# Patient Record
Sex: MALE | Race: WHITE
[De-identification: names, ages, dates, MRNs, and addresses within clinical notes are randomized per-mention and may not be internally consistent; named-entity substitution may affect disease eponyms.]

---

## 2017-02-04 NOTE — ERRECORD
[facility]

                                EMERGENCY RECORD



HPI EXTREMITY (20:22 WMEI)

CHIEF COMPLAINT: Patient presents for evaluation of

      numbness, Patient presents for evaluation of numbness

      tingling both upper extr. from shoulder to finger tips.

      HISTORIAN: History provided by patient, denies injury

      present for 2 wks worse at night. MECHANISM OF INJURY:

      Unknown mechanism. LOCATION: Symptoms are

      localized, both upper extr. TIME COURSE:

      Gradual onset of symptoms, 2, weeks ago.

      ASSOCIATED WITH: No associated distal injury, No associated

      fever, Associated with numbness, constant,

      Associated with tingling, constant.

      EXACERBATED BY: Patient's condition exacerbated by nothing.

      RELIEVED BY: Patient's condition relieved by

      nothing.



ROS (20:24 WMEI)

CONSTITUTIONAL: Historian denies chills, denies fever.

EYES: Historian denies eye pain, denies eye discharge.

ENT: Historian denies otalgia, denies rhinorrhea, denies sore

      throat.

CARDIOVASCULAR: Historian denies chest pain, no radiation.

RESPIRATORY: Historian denies cough, denies shortness of breath.

GI: Historian denies abdominal pain, denies nausea, denies

      vomiting.

GENITOURINARY MALE: Historian denies dysuria, denies urinary

      urgency.

MUSCULOSKELETAL: Historian denies injury, denies joint redness,

      reports joint stiffness, denies joint swelling.

SKIN: Historian denies skin changes, denies skin lesions.

NEUROLOGIC: Historian denies confusion, denies dizziness, denies

      mental status changes, denies paralysis, reports

      paresthesias.

PSYCHIATRIC: Historian denies emotional lability, denies mood

      changes.



PAST MEDICAL HISTORY

MEDICAL HISTORY: No past medical history. (18:14 [facility])

  Notes: hasn't been to doctor for 30 yrs. (20:26 WMEI)

MALE SURGICAL HISTORY: Patient has no surgical history. (18:14

      [facility])

PSYCHIATRIC HISTORY: No previous psychiatric history. (18:14

      [facility])

SOCIAL HISTORY: Patient drinks every day, more

      than 5 drinks per day, Patient denies drug use, Patient

      currently uses tobacco, smokes cigarettes, Patient smokes

      1.5 packs per day. (18:14 [facility])



KNOWN ALLERGIES

No Known Drug Allergies



&a-1R&a+25V*p+0X*i3282Y*c202B*c15G*c2P*p-0X&a-25V&a+1R          Name: Yo Barney  : 1960 M56 MedRec: [id number]

                             AcctNum: [id number]

  Prepared: Sun 2017 06:09 by Interface                 Page 1 of 3

                                      pMD

                        [facility]

                                EMERGENCY RECORD





CURRENT MEDICATIONS (18:15 [facility])

None



VITAL SIGNS

VITAL SIGNS: BP: 138/80, Pulse: 84, Resp: 20 (Non-Labored), Pain:

      8, O2 sat: 97 on Room Air, Time: 2017 18:13. (18:13 [facility])

  Temp: 97.9 (Oral), Time: 2017 18:21. (18:21 [facility])

  BP: 125/87, Pulse: 82, Resp: 18, O2 sat: 98 on RA, Time: 2017 19:16.

      (19:16 [facility])



PHYSICAL EXAM (20:25 [facility]MEI)

CONSTITUTIONAL: Vital signs reviewed, Patient appears non toxic,

      Patient alert and oriented to person, place and time.

HEAD: Head exam included findings of head atraumatic,

      normocephalic.

EYES: Conjunctiva normal, Sclera normal.

ENT: Ear exam normal, Nose exam normal, Pharynx exam normal.

NECK: Neck exam included findings of normal range of motion,

      Trachea midline.

RESPIRATORY CHEST: Breath sounds clear, Chest exam included

      findings of chest movement symmetrical.

CARDIOVASCULAR: Cardiovascular exam included findings of heart

      rate regular rate and rhythm, Heart sounds normal.

ABDOMEN MALE: Abdominal exam included findings of abdomen

      nontender, Liver normal, no distension.

UPPER EXTREMITY: Upper extremity exam included findings of

      inspection normal, range of motion normal, Radial pulse normal, Ulnar

      pulse normal, capillary refill less than 2 seconds, distal motor

      intact, distal sensory intact, no cyanosis, no clubbing, no edema.

LOWER EXTREMITY: Lower extremity exam included findings of

      inspection abnormal, Range of motion, Motor

      strength.

NEURO: [place] coma scale 15, Neuro exam findings include patient

      oriented to person, place and time, Speech normal, Gait normal.

SKIN: Skin exam included findings of skin warm, dry, and normal

      in color.

LYMPHATIC: Lymphatic exam normal.

PSYCHIATRIC: Psychiatric exam included findings of patient

      oriented to person place and time, Normal affect, Judgment normal,

      Insight normal.



MEDICATION ADMINISTRATION SUMMARY



      Drug Name: traMADol, Dose Ordered: 50 mg, Route: Oral, Status: Given,

      Time: 20:35 2017,

      Drug Name: acetaminophen oral, Dose Ordered: 1000 mg, Route: Oral,

      Status: Given, Time: 20:35 2017, Detailed record available in

      Medication Service section.





&a-1R&a+25V*p+0X*o6727M*c202B*c15G*c2P*p-0X&a-25V&a+1R          Name: Yo Barney  : 1960 M56 MedRec: [id number]

                             AcctNum: [id number]

  Prepared: Sun 2017 06:09 by Interface                 Page 2 of 3

                                      pMD

                        [facility]

                                EMERGENCY RECORD



PROBLEM LIST

  No recorded problems



DIAGNOSIS (20:33 [facility]MEI)

FINAL: PRIMARY: parestesias.



PRESCRIPTION (20:32 [facility]MEI)

traMADol:  TABLET : 50 mg : ORAL : Quantity: *** 1 *** Unit:

      tab(s) Route: ORAL Schedule: 2 times a day (before meals) Dispense:

      *** 30 *** Unit: tab(s)

      May substitute. Refills: *** No Refills ***.

NOTES:  No refills.



DISPOSITION

PATIENT:  Disposition Type: Discharge, Disposition: *Discharge

      Home. (20:33 [facility]MEI)

   Patient left the department. (20:44 [facility])

Simons:

  RENAE=REZA Jara, Edis TREVIÑO=REZA Crowley, Katie GRAFF=REZA Young, Dae

  WMEI=DO Hollingsworth William































































&a-1R&a+25V*p+0X*k6266W*c202B*c15G*c2P*p-0X&a-25V&a+1R          Name: Yo Barney  : 1960 M56 MedRec: [id number]

                             AcctNum: [id number]

  Prepared: Sun 2017 06:09 by Interface                 Page 3 of 3

                                      pMD

MTDD

## 2017-02-04 NOTE — RAD
CHEST 2 VIEWS:

 

Date:

02/04/17 

 

FINDINGS:

The heart is normal in size and the lungs are clear. There is no sign of pneumonia or pleural effusi
on. No vascular congestion seen. The trachea is midline. The bony structures are unremarkable. 

 

IMPRESSION: 

No acute findings.  

 

 

POS: HOME

## 2017-02-04 NOTE — PICIS
[facility]

                                EMERGENCY RECORD



TRIAGE (18:12 JHIG)

TRIAGE NOTES:  Pt states that for the past couple of weeks

      he has had intermittent numbness and tingling in his hands. States

      "it is like when your feet fall asleep" Pt states the only time it is

      better is when he stands with his arms down by his side. (18:12

      [facility])

PATIENT: NAME: Yo Barney, AGE: 56, GENDER: male, : , TIME OF GREET: Sat 2017 18:01, PREFERRED

      LANGUAGE: English, ETHNICITY: Not  or , ECODE BILLING

      MAP: [facility], SSN: [id number], Zip Code: [address], KG WEIGHT:

      81.65, PHONE: [phone], MEDICAL RECORD NUMBER: [id number],

      ACCOUNT NUMBER: [id number], PERSON ID: [id number], PAYMENT: SJX Self

      Pay, PCP: none. (18:12 [facility])

COMPLAINT:  hand numbness/tingling. (18:12 [facility])

ADMISSION: URGENCY: 3 Urgent, ADMISSION SOURCE: Home, TRANSPORT:

      [facility], BED: TRIAGE. (18:12 J[facility])

PAIN: Patient complains of pain described as,

      numb, tingling, on a scale 0-10 patient

      rates pain as 8, Pain is intermittent. (18:14 [facility])

IMMUNIZATIONS: Flu vaccine not up to date, Tetanus

      not up to date, Pneumococcal vaccine not up to date.

      (18:14 [facility])

SIRS SCORING: Heart Rate  (0), Temp range 96.8-101.1 (0),

      respiratory rate 12-24 (0), Mental Status altered: no (0), Total SIRS

      Score 0, Infection or Suspected Infection: No. (18:14 [facility])

TRIAGE SCREENING: Patient denies suicidal ideation, Patient

      denies presence of domestic violence. (18:14 [facility])

PROVIDERS: TRIAGE NURSE: Dae Young RN. (18:12 [facility])

VITAL SIGNS: /80, Pulse 84, Resp 20, (Non-Labored), Pain 8,

      O2 Sat 97, on Room Air, Time 2017 18:13. (18:13 [facility])



KNOWN ALLERGIES

No Known Drug Allergies



CURRENT MEDICATIONS (18:15 IG)

None



VITAL SIGNS

VITAL SIGNS: BP: 138/80, Pulse: 84, Resp: 20 (Non-Labored), Pain:

      8, O2 sat: 97 on Room Air, Time: 2017 18:13. (18:13 [facility])

  Temp: 97.9 (Oral), Time: 2017 18:21. (18:21 IG)

  BP: 125/87, Pulse: 82, Resp: 18, O2 sat: 98 on RA, Time: 2017 19:16.

      [facility]



NURSING ASSESSMENT: EXTREMITY UPPER (18:15 [facility])

CONSTITUTIONAL: Complex assessment performed, Patient arrives

      ambulatory, Gait steady, History obtained from patient, Patient

      appears, uncomfortable, Patient cooperative, Patient alert,

      Oriented to person, place and time, Skin warm, Skin dry, Skin normal

      in color, Mucous membranes pink, Mucous membranes moist, Patient is



&a-1R&a+25V*p+0X*c1030Y*c202B*c15G*c2P*p-0X&a-25V&a+1R          Name: Yo Barney  : 1960 M56 MedRec: [id number]

                             AcctNum: [id number]

  Prepared: Sun 2017 06:15 by Interface                 Page 1 of 6

                                      pMD

                        [facility]

                                EMERGENCY RECORD



      well-groomed, Patient complains of hand numbness and tingling,

      Pt states that for the past couple of weeks he has had

      intermittent numbness and tingling in his hands. States "it is like

      when your feet fall asleep" Pt states the only time it is better is

      when he stands with his arms down by his side.

PAIN: numb pain, tingling pain, to

      bilateral hands, on a scale 0-10 patient rates pain as

      8.

LEFT UPPER EXTREMITY: Left upper extremity assessment findings

      include capillary refill less than 2 seconds, Skin color normal to

      hand, Skin temperature to hand warm, Distal sensation intact, Muscle

      tone normal, muscle strength 5, non-pitting edema present.

RIGHT UPPER EXTREMITY: Right upper extremity assessment findings

      include capillary refill less than 2 seconds, Skin color normal to

      hand, Skin temperature to hand warm, Distal sensation intact, Muscle

      tone normal, muscle strength 5, non-pitting edema present.

SAFETY: Side rails up, Cart/Stretcher in lowest position, Family

      at bedside, Call light within reach, Hospital ID band on.



NURSING ASSESSMENT: NEURO (19:46 BMAD)

GCS: (6) Obeying command:, (5) Orientated:, (4) Spontaneous eye

      opening., Result: 15.

NIHSS: CVA assessment findings: Level of consciousness: alert,

      keenly responsive (0), Questions: answers both questions correctly

      (0), Commands: performs both tasks correctly (0), Best gaze: normal

      (0), Visual: no visual loss (0), Facial palsy: normal symmetrical

      movement (0), Motor Left Arm: no drift, arm stays 90/45 degrees for

      full 10 seconds (0), Motor Right Arm: no drift, arm stays 90/45

      degrees for full 10 seconds (0), Motor left leg: no drift, leg stays

      at 30 degrees for full five seconds (0), Motor right leg: no drift,

      leg stays at 30 degrees for full five seconds (0), Limb ataxia absent

      (0), Sensory: normal, no sensory loss (0), Best language: no aphasia;

      normal (0), Dysarthria: normal (0), Extinction and Inattention:

      normal (0), Total score 0.



NURSING PROCEDURE: DISCHARGE NOTE (20:43 BMAD)

DISCHARGE: Patient discharged to home, ambulating without

      assistance, family driving, accompanied by /wife/partner,

      Summary of Care printed/ provided, Patient requested and was provided

      an electronic copy of Discharge Instructions, Transition record given

      to patient, Discharge instructions given to patient, Simple or

      moderate discharge teaching performed, by Edis COBB, Prescriptions

      given and instructions on side effects given, Name of prescription(s)

      given: Tramadol, Above person(s) verbalized understanding of

      discharge instructions and follow-up care, Patient discharged by, Dr. Hollingsworth.

BELONGINGS: Belongings and valuables with patient at time of

      discharge include:, Belongings remain with patient, Valuables remain

      with patient.





&a-1R&a+25V*p+0X*p7793R*c202B*c15G*c2P*p-0X&a-25V&a+1R          Name: Yo Barney  : 1960 M56 MedRec: [id number]

                             AcctNum: [id number]

  Prepared: Sun 2017 06:15 by Interface                 Page 2 of 6

                                      pMD

                        [facility]

                                EMERGENCY RECORD



NURSING PROCEDURE: NURSE NOTES (19:16 [facility])

NURSES NOTES: Notes: pt ambulated to rm 4 without incident.

      pt is cooperative, his visitor is very verbally aggressive, upset

      that she and the pt arrived the same time as 4 other people and 2

      were emergent and were seen before the pt was. triage process

      explained to pt/visitor without violating any HIPPA violations ie

      disclosing names/dx with pt voicing "thank you" and the visitor

      saying, "I don't care. that doesn't matter and you need to get him

      taken care of." pt/visitor advised that the MD will be with them as

      soon as he can.

VITAL SIGNS: BP: 125, / 87, Pulse: 82, Resp: 18, O2 sat: 98, on:

      RA.



ORDER DETAILS



      Order Name: CBC with Differential, Status: Active, Time: 19:43

      2017, User: [facility],

       - Ordered for: DO Hollingsworth William,

       - Entered by: DO Hollingsworth William - Sat 2017 19:43,

       - Quantity: 1,

      Order Name: Comprehensive Metabolic Panel, Status: Active, Time:

      19:43 2017, User: JENNIFER,

       - Ordered for: DO Hollingsworth William,

       - Entered by: DO Hollingsworth William - Sat 2017 19:43,

       - Quantity: 1,

      Order Name: XR Chest Pa & Lat STANDARD, Status: Active, Time: 19:43

      2017, User: JENNIFER,

       - Ordered for: DO Hollingsworth William,

       - Entered by: DO Hollingsworth William - Sat 2017 19:43,

       - Quantity: 1.



MEDICATION ADMINISTRATION SUMMARY



      Drug Name: traMADol, Dose Ordered: 50 mg, Route: Oral, Status: Given,

      Time: 20:35 2017,

      Drug Name: acetaminophen oral, Dose Ordered: 1000 mg, Route: Oral,

      Status: Given, Time: 20:35 2017, Detailed record available in

      Medication Service section.



MEDICATION SERVICE (20:35 WMEI)

acetaminophen oral:  Order: acetaminophen oral (acetaminophen) -

      Dose: 1000 mg : Oral

      Schedule: Now

      Ordered by: Chinmay Hollingsworth DO

      Entered by: Chinmay Hollingsworth DO Sat 2017 20:32 ,

      Acknowledged by: Edis Jara RN Sat 2017 20:33

      Documented as given by: Edis Jara RN Sat 2017 20:35

      Patient, Medication, Dose, Route and Time verified prior to

      administration.

       Amount given: 1000mg, Site: Medication administered P.O., Correct



&a-1R&a+25V*p+0X*r6722L*c202B*c15G*c2P*p-0X&a-25V&a+1R          Name: Yo Barney  : 1960 M56 MedRec: [id number]

                             AcctNum: [id number]

  Prepared: Sun 2017 06:15 by Interface                 Page 3 of 6

                                      pMD

                        [facility]

                                EMERGENCY RECORD



      patient, time, route, dose and medication confirmed prior to

      administration, Patient advised of actions and side-effects prior to

      administration, Allergies confirmed and medications reviewed prior to

      administration.

traMADol:  Order: traMADol (tramadol HCl) - Dose: 50 mg

      : Oral

      Schedule: Now

      Ordered by: Chinmay Hollingsworth DO

      Entered by: Chinmay Hollingsworth DO Sat 2017 20:32 ,

      Acknowledged by: Edis Jara RN Sat 2017 20:33

      Documented as given by: Edis Jara RN Sat 2017 20:35

      Patient, Medication, Dose, Route and Time verified prior to

      administration.

       Amount given: 50mg, Site: Medication administered P.O., Correct

      patient, time, route, dose and medication confirmed prior to

      administration, Patient advised of actions and side-effects prior to

      administration, Allergies confirmed and medications reviewed prior to

      administration, Patient in position of comfort, Side rails up, Cart

      in lowest position, Family at bedside.



HPI EXTREMITY (20:22 WMEI)

CHIEF COMPLAINT: Patient presents for evaluation of

      numbness, Patient presents for evaluation of numbness

      tingling both upper extr. from shoulder to finger tips.

      HISTORIAN: History provided by patient, denies injury

      present for 2 wks worse at night. MECHANISM OF INJURY:

      Unknown mechanism. LOCATION: Symptoms are

      localized, both upper extr. TIME COURSE:

      Gradual onset of symptoms, 2, weeks ago.

      ASSOCIATED WITH: No associated distal injury, No associated

      fever, Associated with numbness, constant,

      Associated with tingling, constant.

      EXACERBATED BY: Patient's condition exacerbated by nothing.

      RELIEVED BY: Patient's condition relieved by

      nothing.



ROS (20:24 WMEI)

CONSTITUTIONAL: Historian denies chills, denies fever.

EYES: Historian denies eye pain, denies eye discharge.

ENT: Historian denies otalgia, denies rhinorrhea, denies sore

      throat.

CARDIOVASCULAR: Historian denies chest pain, no radiation.

RESPIRATORY: Historian denies cough, denies shortness of breath.

GI: Historian denies abdominal pain, denies nausea, denies

      vomiting.

GENITOURINARY MALE: Historian denies dysuria, denies urinary

      urgency.

MUSCULOSKELETAL: Historian denies injury, denies joint redness,

      reports joint stiffness, denies joint swelling.

SKIN: Historian denies skin changes, denies skin lesions.



&a-1R&a+25V*p+0X*c6839D*c202B*c15G*c2P*p-0X&a-25V&a+1R          Name: Yo Barney  : 1960 M56 MedRec: [id number]

                             AcctNum: [id number]

  Prepared: Sun 2017 06:15 by Interface                 Page 4 of 6

                                      pMD

                        [facility]

                                EMERGENCY RECORD



NEUROLOGIC: Historian denies confusion, denies dizziness, denies

      mental status changes, denies paralysis, reports

      paresthesias.

PSYCHIATRIC: Historian denies emotional lability, denies mood

      changes.



PAST MEDICAL HISTORY

MEDICAL HISTORY: No past medical history. (18:14 [facility])

  Notes: hasn't been to doctor for 30 yrs. (20:26 WMEI)

MALE SURGICAL HISTORY: Patient has no surgical history. (18:14

      [facility])

PSYCHIATRIC HISTORY: No previous psychiatric history. (18:14

      [facility])

SOCIAL HISTORY: Patient drinks every day, more

      than 5 drinks per day, Patient denies drug use, Patient

      currently uses tobacco, smokes cigarettes, Patient smokes

      1.5 packs per day. (18:14 [facility])



PHYSICAL EXAM (20:25 WMEI)

CONSTITUTIONAL: Vital signs reviewed, Patient appears non toxic,

      Patient alert and oriented to person, place and time.

HEAD: Head exam included findings of head atraumatic,

      normocephalic.

EYES: Conjunctiva normal, Sclera normal.

ENT: Ear exam normal, Nose exam normal, Pharynx exam normal.

NECK: Neck exam included findings of normal range of motion,

      Trachea midline.

RESPIRATORY CHEST: Breath sounds clear, Chest exam included

      findings of chest movement symmetrical.

CARDIOVASCULAR: Cardiovascular exam included findings of heart

      rate regular rate and rhythm, Heart sounds normal.

ABDOMEN MALE: Abdominal exam included findings of abdomen

      nontender, Liver normal, no distension.

UPPER EXTREMITY: Upper extremity exam included findings of

      inspection normal, range of motion normal, Radial pulse normal, Ulnar

      pulse normal, capillary refill less than 2 seconds, distal motor

      intact, distal sensory intact, no cyanosis, no clubbing, no edema.

LOWER EXTREMITY: Lower extremity exam included findings of

      inspection abnormal, Range of motion, Motor

      strength.

NEURO: [place] coma scale 15, Neuro exam findings include patient

      oriented to person, place and time, Speech normal, Gait normal.

SKIN: Skin exam included findings of skin warm, dry, and normal

      in color.

LYMPHATIC: Lymphatic exam normal.

PSYCHIATRIC: Psychiatric exam included findings of patient

      oriented to person place and time, Normal affect, Judgment normal,

      Insight normal.



EVENTS



&a-1R&a+25V*p+0X*g7255J*c202B*c15G*c2P*p-0X&a-25V&a+1R          Name: Yo Barney  : 1960 M56 MedRec: [id number]

                             AcctNum: [id number]

  Prepared: Sun 2017 06:15 by Interface                 Page 5 of 6

                                      pMD

                        [facility]

                                EMERGENCY RECORD



TRANSFER:  Triage to Emergency Triage. (Sat 2017 18:12

      [facility])

   Emergency Triage to Emergency Room -05. (18:13 [facility])

   Emergency Emergency Room -05 to -04. (19:15 [facility])

   Removed from Emergency Emergency Room -04. (20:44 BMAD)



PROBLEM LIST

  No recorded problems



DIAGNOSIS (20:33 WMEI)

FINAL: PRIMARY: parestesias.



DISPOSITION

PATIENT:  Disposition Type: Discharge, Disposition: *Discharge

      Home. (20:33 WMEI)

   Patient left the department. (20:44 BMAD)



INSTRUCTION (20:34 WMEI)

DISCHARGE:  PERIPHERAL NEUROPATHY.

SPECIAL:  Follow-up with your PCP.



PRESCRIPTION (20:32 WMEI)

traMADol:  TABLET : 50 mg : ORAL : Quantity: *** 1 *** Unit:

      tab(s) Route: ORAL Schedule: 2 times a day (before meals) Dispense:

      *** 30 *** Unit: tab(s)

      May substitute. Refills: *** No Refills ***.

NOTES:  No refills.



ADMIN (Sun 2017 06:05 WMEI)

DIGITAL SIGNATURE:  DO Hollingsworth William.

Simons:

  BMLASHAUN=REZA Jara, Edis CHOB=REZA Crowley, Katie JERRY=REZA Young, Dae

  WMEI=DO Hollingsworth William





































&a-1R&a+25V*p+0X*r6159F*c202B*c15G*c2P*p-0X&a-25V&a+1R          Name: Yo Barney  : 1960 M56 MedRec: [id number]

                             AcctNum: [id number]

  Prepared: Sun 2017 06:15 by Interface                 Page 6 of 6

                                      pMD

MTDD

## 2018-06-05 NOTE — RAD
LEFT ELBOW TWO VIEWS:

 

06/04/2018

 

COMPARISON:

Views of the right elbow.

 

FINDINGS:

No fracture or joint effusion is seen.  There is a little ossification at the origin of the tendons f
rom the medial epicondyle, but the findings are minimal compared to the other side.

 

IMPRESSION:

No acute finding.

 

POS: HOME

## 2018-06-05 NOTE — RAD
RIGHT WRIST TWO VIEWS:

 

06/04/2018

 

FINDINGS:

AP and lateral views show no bony fracture.  The intercarpal joints seem unremarkable, though are not
 fully evaluated without the oblique view.  There may have been old trauma to the distal scaphoid.  A
 metallic foreign body is seen on the ventral aspect of the distal forearm, in the superficial soft t
issues.

 

IMPRESSION:

No acute finding.

 

POS: HOME

## 2018-06-05 NOTE — RAD
LEFT HAND TWO VIEWS:

 

06/04/2018

 

COMPARISON:

Views of the right hand.

 

FINDINGS:

No fracture, dislocation, or joint abnormality is seen.  There may have been old trauma at the base o
f the thumb, laterally.  The carpals are not fully evaluated but grossly appear normal.  There are no
 bony erosions.

 

IMPRESSION:

No acute finding.

 

POS: HOME

## 2018-06-05 NOTE — RAD
LEFT WRIST TWO VIEWS:

 

06/04/2018

 

COMPARISON:

Views of the opposite wrist.

 

FINDINGS:

No carpal abnormalities are appreciated, though they are not fully evaluated.  The intercarpal joints
 seem normal.  Old trauma is suggested at the base of the proximal phalanx of the thumb.

 

IMPRESSION:

No acute finding.

 

POS: HOME

## 2018-06-05 NOTE — RAD
RIGHT HAND TWO VIEWS:

 

06/04/2018

 

TECHNIQUE:

AP and lateral views were provided.

 

FINDINGS:

No acute fracture is seen.  An old injury to the DIP joint of the index finger is suggested.  The jake
nts are otherwise unremarkable in appearance.  No bony erosions are seen.  The carpals are not fully 
evaluated without an oblique view.  There may have been old trauma to the distal scaphoid, but there 
is certainly no sign of avascular necrosis.  Incidentally noted was a tiny metallic foreign body in t
he superficial soft tissues on the ventral aspect of the distal forearm.  The PIP joint of the fifth 
finger is abnormal, and there is suspicion of old trauma here as well.

 

IMPRESSION:

No acute findings.

 

POS: HOME

## 2018-06-05 NOTE — RAD
RIGHT ELBOW TWO VIEWS:

 

06/04/2018

 

FINDINGS:

No fracture or joint effusion is seen.  There is ossification at the insertion of the tendons at both
 the medial and lateral aspects of the humeral condyles.  Otherwise, the joint is unremarkable, excep
t for some minor bony spurring involving the coronoid process.

 

IMPRESSION:

Mild chronic findings as listed above.

 

POS: HOME

## 2022-07-25 ENCOUNTER — HOSPITAL ENCOUNTER (EMERGENCY)
Dept: HOSPITAL 57 - BURERS | Age: 62
Discharge: HOME | End: 2022-07-25
Payer: COMMERCIAL

## 2022-07-25 DIAGNOSIS — F17.210: ICD-10-CM

## 2022-07-25 DIAGNOSIS — S32.019A: Primary | ICD-10-CM

## 2022-07-25 DIAGNOSIS — S22.42XA: ICD-10-CM

## 2022-07-25 DIAGNOSIS — M50.21: ICD-10-CM
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
## 2022-07-25 LAB
ALBUMIN SERPL BCG-MCNC: 4.3 G/DL (ref 3.5–5)
ALP SERPL-CCNC: 478 U/L (ref 40–110)
ALT SERPL W P-5'-P-CCNC: 18 U/L (ref 8–55)
ANION GAP SERPL CALC-SCNC: 16 MMOL/L (ref 10–20)
AST SERPL-CCNC: 23 U/L (ref 5–34)
BASOPHILS # BLD AUTO: 0.2 THOU/UL (ref 0–0.2)
BASOPHILS NFR BLD AUTO: 1.2 % (ref 0–1)
BILIRUB SERPL-MCNC: 1 MG/DL (ref 0.2–1.2)
BUN SERPL-MCNC: 20 MG/DL (ref 8.4–25.7)
CALCIUM SERPL-MCNC: 9.7 MG/DL (ref 7.8–10.44)
CHLORIDE SERPL-SCNC: 100 MMOL/L (ref 98–107)
CK MB SERPL-MCNC: 2 NG/ML (ref 0–6.6)
CO2 SERPL-SCNC: 25 MMOL/L (ref 22–29)
CREAT CL PREDICTED SERPL C-G-VRATE: 0 ML/MIN (ref 70–130)
EOSINOPHIL # BLD AUTO: 0.5 THOU/UL (ref 0–0.7)
EOSINOPHIL NFR BLD AUTO: 3.7 % (ref 0–10)
GLOBULIN SER CALC-MCNC: 3.1 G/DL (ref 2.4–3.5)
GLUCOSE SERPL-MCNC: 129 MG/DL (ref 70–105)
HGB BLD-MCNC: 11.5 G/DL (ref 14–18)
LYMPHOCYTES # BLD AUTO: 3.7 THOU/UL (ref 1.2–3.4)
LYMPHOCYTES NFR BLD AUTO: 29.2 % (ref 21–51)
MCH RBC QN AUTO: 33.2 PG (ref 27–31)
MCV RBC AUTO: 91.9 FL (ref 78–98)
MONOCYTES # BLD AUTO: 0.8 THOU/UL (ref 0.11–0.59)
MONOCYTES NFR BLD AUTO: 6.5 % (ref 0–10)
NEUTROPHILS # BLD AUTO: 7.6 THOU/UL (ref 1.4–6.5)
NEUTROPHILS NFR BLD AUTO: 59.4 % (ref 42–75)
PLATELET # BLD AUTO: 180 THOU/UL (ref 130–400)
POTASSIUM SERPL-SCNC: 4.2 MMOL/L (ref 3.5–5.1)
RBC # BLD AUTO: 3.47 MILL/UL (ref 4.7–6.1)
SODIUM SERPL-SCNC: 137 MMOL/L (ref 136–145)
WBC # BLD AUTO: 12.8 THOU/UL (ref 4.8–10.8)

## 2022-07-25 PROCEDURE — 82553 CREATINE MB FRACTION: CPT

## 2022-07-25 PROCEDURE — 85025 COMPLETE CBC W/AUTO DIFF WBC: CPT

## 2022-07-25 PROCEDURE — 80053 COMPREHEN METABOLIC PANEL: CPT

## 2022-07-25 PROCEDURE — 96374 THER/PROPH/DIAG INJ IV PUSH: CPT

## 2022-07-25 PROCEDURE — 72125 CT NECK SPINE W/O DYE: CPT

## 2022-07-25 PROCEDURE — 93005 ELECTROCARDIOGRAM TRACING: CPT

## 2022-07-25 PROCEDURE — 96375 TX/PRO/DX INJ NEW DRUG ADDON: CPT

## 2022-07-25 PROCEDURE — 72131 CT LUMBAR SPINE W/O DYE: CPT

## 2022-07-25 PROCEDURE — 86140 C-REACTIVE PROTEIN: CPT

## 2022-07-25 PROCEDURE — 96361 HYDRATE IV INFUSION ADD-ON: CPT

## 2022-07-25 PROCEDURE — 84484 ASSAY OF TROPONIN QUANT: CPT

## 2022-08-07 ENCOUNTER — HOSPITAL ENCOUNTER (EMERGENCY)
Dept: HOSPITAL 57 - BURERS | Age: 62
Discharge: HOME | End: 2022-08-07
Payer: COMMERCIAL

## 2022-08-07 DIAGNOSIS — F17.210: ICD-10-CM

## 2022-08-07 DIAGNOSIS — K59.00: Primary | ICD-10-CM

## 2022-08-07 DIAGNOSIS — M54.50: ICD-10-CM

## 2022-08-07 DIAGNOSIS — Z79.899: ICD-10-CM

## 2022-08-07 PROCEDURE — 99283 EMERGENCY DEPT VISIT LOW MDM: CPT

## 2022-09-05 ENCOUNTER — HOSPITAL ENCOUNTER (INPATIENT)
Dept: HOSPITAL 92 - ERS | Age: 62
LOS: 5 days | DRG: 870 | End: 2022-09-10
Attending: FAMILY MEDICINE | Admitting: INTERNAL MEDICINE
Payer: COMMERCIAL

## 2022-09-05 VITALS — BODY MASS INDEX: 25 KG/M2

## 2022-09-05 DIAGNOSIS — R91.8: ICD-10-CM

## 2022-09-05 DIAGNOSIS — Z66: ICD-10-CM

## 2022-09-05 DIAGNOSIS — Z78.1: ICD-10-CM

## 2022-09-05 DIAGNOSIS — I21.A1: ICD-10-CM

## 2022-09-05 DIAGNOSIS — D61.818: ICD-10-CM

## 2022-09-05 DIAGNOSIS — J96.01: ICD-10-CM

## 2022-09-05 DIAGNOSIS — J90: ICD-10-CM

## 2022-09-05 DIAGNOSIS — C78.7: ICD-10-CM

## 2022-09-05 DIAGNOSIS — K31.82: ICD-10-CM

## 2022-09-05 DIAGNOSIS — C34.90: ICD-10-CM

## 2022-09-05 DIAGNOSIS — E16.2: ICD-10-CM

## 2022-09-05 DIAGNOSIS — N13.30: ICD-10-CM

## 2022-09-05 DIAGNOSIS — R57.1: ICD-10-CM

## 2022-09-05 DIAGNOSIS — R65.20: ICD-10-CM

## 2022-09-05 DIAGNOSIS — F17.210: ICD-10-CM

## 2022-09-05 DIAGNOSIS — G89.29: ICD-10-CM

## 2022-09-05 DIAGNOSIS — N17.9: ICD-10-CM

## 2022-09-05 DIAGNOSIS — D62: ICD-10-CM

## 2022-09-05 DIAGNOSIS — G93.41: ICD-10-CM

## 2022-09-05 DIAGNOSIS — Z87.81: ICD-10-CM

## 2022-09-05 DIAGNOSIS — Z51.5: ICD-10-CM

## 2022-09-05 DIAGNOSIS — D65: ICD-10-CM

## 2022-09-05 DIAGNOSIS — N18.2: ICD-10-CM

## 2022-09-05 DIAGNOSIS — K29.80: ICD-10-CM

## 2022-09-05 DIAGNOSIS — A41.9: Primary | ICD-10-CM

## 2022-09-05 DIAGNOSIS — Z20.822: ICD-10-CM

## 2022-09-05 DIAGNOSIS — C79.70: ICD-10-CM

## 2022-09-05 DIAGNOSIS — K55.1: ICD-10-CM

## 2022-09-05 DIAGNOSIS — E87.2: ICD-10-CM

## 2022-09-05 DIAGNOSIS — C79.51: ICD-10-CM

## 2022-09-05 DIAGNOSIS — Z79.891: ICD-10-CM

## 2022-09-05 LAB
ALBUMIN SERPL BCG-MCNC: 3.5 G/DL (ref 3.5–5)
ALP SERPL-CCNC: 402 U/L (ref 40–110)
ALT SERPL W P-5'-P-CCNC: 15 U/L (ref 8–55)
ANALYZER IN CARDIO: (no result)
ANALYZER IN CARDIO: (no result)
ANION GAP SERPL CALC-SCNC: 28 MMOL/L (ref 10–20)
ANISOCYTOSIS BLD QL SMEAR: (no result) (100X)
APTT PPP: 44.8 SEC (ref 22.9–36.1)
AST SERPL-CCNC: 42 U/L (ref 5–34)
BASE EXCESS STD BLDA CALC-SCNC: -9 MEQ/L
BASE EXCESS STD BLDV CALC-SCNC: -12.8 MEQ/L
BILIRUB SERPL-MCNC: 1.2 MG/DL (ref 0.2–1.2)
BUN SERPL-MCNC: 38 MG/DL (ref 8.4–25.7)
CA-I BLDA-SCNC: 1.01 MMOL/L (ref 1.12–1.3)
CA-I BLDV-MCNC: 1.01 MMOL/L (ref 1.16–1.32)
CALCIUM SERPL-MCNC: 8.6 MG/DL (ref 7.8–10.44)
CHLORIDE BLDV-SCNC: 102 MMOL/L (ref 98–106)
CHLORIDE SERPL-SCNC: 96 MMOL/L (ref 98–107)
CK MB SERPL-MCNC: 15.3 NG/ML (ref 0–6.6)
CO2 SERPL-SCNC: 11 MMOL/L (ref 22–29)
COMM CRITICAL RESULTS DOC: (no result)
CREAT CL PREDICTED SERPL C-G-VRATE: 0 ML/MIN (ref 70–130)
DRUG SCREEN CUTOFF: (no result)
GLOBULIN SER CALC-MCNC: 3.1 G/DL (ref 2.4–3.5)
GLUCOSE SERPL-MCNC: 129 MG/DL (ref 70–105)
HCO3 BLDA-SCNC: 15.7 MEQ/L (ref 22–28)
HCO3 BLDV-SCNC: 12 MEQ/L (ref 22–28)
HCT VFR BLDA CALC: 18 % (ref 42–52)
HCT VFR BLDV CALC: 8 % (ref 42–52)
HGB BLD-MCNC: 3.8 G/DL (ref 14–18)
HGB BLD-MCNC: 7 G/DL (ref 14–18)
HGB BLD-MCNC: 7.8 G/DL (ref 14–18)
HGB BLDA-MCNC: 6.1 G/DL (ref 14–18)
HGB BLDV-MCNC: 2.7 G/DL (ref 13.1–17.2)
INR PPP: 1.3
LIPASE SERPL-CCNC: 43 U/L (ref 8–78)
MCH RBC QN AUTO: 30.9 PG (ref 27–31)
MCH RBC QN AUTO: 31.9 PG (ref 27–31)
MCH RBC QN AUTO: 33 PG (ref 27–31)
MCV RBC AUTO: 86.6 FL (ref 78–98)
MCV RBC AUTO: 92.6 FL (ref 78–98)
MCV RBC AUTO: 94.4 FL (ref 78–98)
MDIFF COMPLETE?: YES
PCO2 BLDA: 28.8 MMHG (ref 35–45)
PH BLDA: 7.35 [PH] (ref 7.35–7.45)
PLATELET # BLD AUTO: 13 THOU/UL (ref 130–400)
PLATELET # BLD AUTO: 26 THOU/UL (ref 130–400)
PLATELET # BLD AUTO: 53 THOU/UL (ref 130–400)
PO2 BLDA: 510.8 MMHG (ref 80–100)
POLYCHROMASIA BLD QL SMEAR: (no result) (100X)
POTASSIUM BLD-SCNC: 4.27 MMOL/L (ref 3.7–5.3)
POTASSIUM BLDV-SCNC: 4.32 MMOL/L (ref 3.7–5.3)
POTASSIUM SERPL-SCNC: 5 MMOL/L (ref 3.5–5.1)
PROT UR STRIP.AUTO-MCNC: 20 MG/DL
PROTHROMBIN TIME: 15.9 SEC (ref 12–14.7)
RBC # BLD AUTO: 1.14 MILL/UL (ref 4.7–6.1)
RBC # BLD AUTO: 2.18 MILL/UL (ref 4.7–6.1)
RBC # BLD AUTO: 2.51 MILL/UL (ref 4.7–6.1)
REFLEX FOR REVIEW??: YES
SCHISTOCYTES BLD QL SMEAR: (no result) (100X)
SODIUM BLDV-SCNC: 127.4 MMOL/L (ref 133–146)
SODIUM SERPL-SCNC: 130 MMOL/L (ref 136–145)
SP GR UR STRIP: 1.03 (ref 1–1.04)
SPECIMEN DRAWN FROM PATIENT: (no result)
TROPONIN I SERPL DL<=0.01 NG/ML-MCNC: 5.53 NG/ML (ref ?–0.03)
TROPONIN I SERPL DL<=0.01 NG/ML-MCNC: 5.69 NG/ML (ref ?–0.03)
WBC # BLD AUTO: 10.8 THOU/UL (ref 4.8–10.8)
WBC # BLD AUTO: 16.1 THOU/UL (ref 4.8–10.8)
WBC # BLD AUTO: 9.1 THOU/UL (ref 4.8–10.8)

## 2022-09-05 PROCEDURE — 82533 TOTAL CORTISOL: CPT

## 2022-09-05 PROCEDURE — 74177 CT ABD & PELVIS W/CONTRAST: CPT

## 2022-09-05 PROCEDURE — 0D9670Z DRAINAGE OF STOMACH WITH DRAINAGE DEVICE, VIA NATURAL OR ARTIFICIAL OPENING: ICD-10-PCS

## 2022-09-05 PROCEDURE — 31500 INSERT EMERGENCY AIRWAY: CPT

## 2022-09-05 PROCEDURE — 82945 GLUCOSE OTHER FLUID: CPT

## 2022-09-05 PROCEDURE — 82140 ASSAY OF AMMONIA: CPT

## 2022-09-05 PROCEDURE — P9035 PLATELET PHERES LEUKOREDUCED: HCPCS

## 2022-09-05 PROCEDURE — 87040 BLOOD CULTURE FOR BACTERIA: CPT

## 2022-09-05 PROCEDURE — 84484 ASSAY OF TROPONIN QUANT: CPT

## 2022-09-05 PROCEDURE — 3E03329 INTRODUCTION OF OTHER ANTI-INFECTIVE INTO PERIPHERAL VEIN, PERCUTANEOUS APPROACH: ICD-10-PCS | Performed by: INTERNAL MEDICINE

## 2022-09-05 PROCEDURE — 84100 ASSAY OF PHOSPHORUS: CPT

## 2022-09-05 PROCEDURE — 81003 URINALYSIS AUTO W/O SCOPE: CPT

## 2022-09-05 PROCEDURE — 36600 WITHDRAWAL OF ARTERIAL BLOOD: CPT

## 2022-09-05 PROCEDURE — 80053 COMPREHEN METABOLIC PANEL: CPT

## 2022-09-05 PROCEDURE — 82105 ALPHA-FETOPROTEIN SERUM: CPT

## 2022-09-05 PROCEDURE — 5A1955Z RESPIRATORY VENTILATION, GREATER THAN 96 CONSECUTIVE HOURS: ICD-10-PCS

## 2022-09-05 PROCEDURE — 85049 AUTOMATED PLATELET COUNT: CPT

## 2022-09-05 PROCEDURE — 87081 CULTURE SCREEN ONLY: CPT

## 2022-09-05 PROCEDURE — 30233R1 TRANSFUSION OF NONAUTOLOGOUS PLATELETS INTO PERIPHERAL VEIN, PERCUTANEOUS APPROACH: ICD-10-PCS | Performed by: INTERNAL MEDICINE

## 2022-09-05 PROCEDURE — P9016 RBC LEUKOCYTES REDUCED: HCPCS

## 2022-09-05 PROCEDURE — 36416 COLLJ CAPILLARY BLOOD SPEC: CPT

## 2022-09-05 PROCEDURE — 0BH18EZ INSERTION OF ENDOTRACHEAL AIRWAY INTO TRACHEA, VIA NATURAL OR ARTIFICIAL OPENING ENDOSCOPIC: ICD-10-PCS

## 2022-09-05 PROCEDURE — 82553 CREATINE MB FRACTION: CPT

## 2022-09-05 PROCEDURE — 94003 VENT MGMT INPAT SUBQ DAY: CPT

## 2022-09-05 PROCEDURE — 84133 ASSAY OF URINE POTASSIUM: CPT

## 2022-09-05 PROCEDURE — 86901 BLOOD TYPING SEROLOGIC RH(D): CPT

## 2022-09-05 PROCEDURE — 94002 VENT MGMT INPAT INIT DAY: CPT

## 2022-09-05 PROCEDURE — 84443 ASSAY THYROID STIM HORMONE: CPT

## 2022-09-05 PROCEDURE — 93306 TTE W/DOPPLER COMPLETE: CPT

## 2022-09-05 PROCEDURE — 30233N1 TRANSFUSION OF NONAUTOLOGOUS RED BLOOD CELLS INTO PERIPHERAL VEIN, PERCUTANEOUS APPROACH: ICD-10-PCS | Performed by: INTERNAL MEDICINE

## 2022-09-05 PROCEDURE — 85610 PROTHROMBIN TIME: CPT

## 2022-09-05 PROCEDURE — 74174 CTA ABD&PLVS W/CONTRAST: CPT

## 2022-09-05 PROCEDURE — 93005 ELECTROCARDIOGRAM TRACING: CPT

## 2022-09-05 PROCEDURE — 83935 ASSAY OF URINE OSMOLALITY: CPT

## 2022-09-05 PROCEDURE — 85730 THROMBOPLASTIN TIME PARTIAL: CPT

## 2022-09-05 PROCEDURE — 84300 ASSAY OF URINE SODIUM: CPT

## 2022-09-05 PROCEDURE — 87389 HIV-1 AG W/HIV-1&-2 AB AG IA: CPT

## 2022-09-05 PROCEDURE — 87086 URINE CULTURE/COLONY COUNT: CPT

## 2022-09-05 PROCEDURE — 84439 ASSAY OF FREE THYROXINE: CPT

## 2022-09-05 PROCEDURE — 80306 DRUG TEST PRSMV INSTRMNT: CPT

## 2022-09-05 PROCEDURE — 86900 BLOOD TYPING SEROLOGIC ABO: CPT

## 2022-09-05 PROCEDURE — 85362 FIBRIN DEGRADATION PRODUCTS: CPT

## 2022-09-05 PROCEDURE — 85025 COMPLETE CBC W/AUTO DIFF WBC: CPT

## 2022-09-05 PROCEDURE — 83735 ASSAY OF MAGNESIUM: CPT

## 2022-09-05 PROCEDURE — 86850 RBC ANTIBODY SCREEN: CPT

## 2022-09-05 PROCEDURE — 70450 CT HEAD/BRAIN W/O DYE: CPT

## 2022-09-05 PROCEDURE — 85300 ANTITHROMBIN III ACTIVITY: CPT

## 2022-09-05 PROCEDURE — 71045 X-RAY EXAM CHEST 1 VIEW: CPT

## 2022-09-05 PROCEDURE — 85379 FIBRIN DEGRADATION QUANT: CPT

## 2022-09-05 PROCEDURE — C9113 INJ PANTOPRAZOLE SODIUM, VIA: HCPCS

## 2022-09-05 PROCEDURE — 71260 CT THORAX DX C+: CPT

## 2022-09-05 PROCEDURE — 71275 CT ANGIOGRAPHY CHEST: CPT

## 2022-09-05 PROCEDURE — 82378 CARCINOEMBRYONIC ANTIGEN: CPT

## 2022-09-05 PROCEDURE — 82570 ASSAY OF URINE CREATININE: CPT

## 2022-09-05 PROCEDURE — 36430 TRANSFUSION BLD/BLD COMPNT: CPT

## 2022-09-05 PROCEDURE — 85046 RETICYTE/HGB CONCENTRATE: CPT

## 2022-09-05 PROCEDURE — 36415 COLL VENOUS BLD VENIPUNCTURE: CPT

## 2022-09-05 PROCEDURE — 83690 ASSAY OF LIPASE: CPT

## 2022-09-05 PROCEDURE — 83605 ASSAY OF LACTIC ACID: CPT

## 2022-09-05 PROCEDURE — 82805 BLOOD GASES W/O2 SATURATION: CPT

## 2022-09-05 PROCEDURE — 83010 ASSAY OF HAPTOGLOBIN QUANT: CPT

## 2022-09-05 PROCEDURE — 83615 LACTATE (LD) (LDH) ENZYME: CPT

## 2022-09-05 PROCEDURE — 85060 BLOOD SMEAR INTERPRETATION: CPT

## 2022-09-05 PROCEDURE — 80202 ASSAY OF VANCOMYCIN: CPT

## 2022-09-05 PROCEDURE — 85384 FIBRINOGEN ACTIVITY: CPT

## 2022-09-05 PROCEDURE — 82947 ASSAY GLUCOSE BLOOD QUANT: CPT

## 2022-09-06 LAB
ALBUMIN SERPL BCG-MCNC: 3 G/DL (ref 3.5–5)
ALP SERPL-CCNC: 342 U/L (ref 40–110)
ALT SERPL W P-5'-P-CCNC: 29 U/L (ref 8–55)
ANALYZER IN CARDIO: (no result)
ANION GAP SERPL CALC-SCNC: 15 MMOL/L (ref 10–20)
APTT PPP: 41.2 SEC (ref 22.9–36.1)
AST SERPL-CCNC: 70 U/L (ref 5–34)
BASE EXCESS STD BLDA CALC-SCNC: -8 MEQ/L
BILIRUB SERPL-MCNC: 1.6 MG/DL (ref 0.2–1.2)
BUN SERPL-MCNC: 33 MG/DL (ref 8.4–25.7)
CA-I BLDA-SCNC: 1.07 MMOL/L (ref 1.12–1.3)
CALCIUM SERPL-MCNC: 7.8 MG/DL (ref 7.8–10.44)
CHLORIDE SERPL-SCNC: 106 MMOL/L (ref 98–107)
CO2 SERPL-SCNC: 18 MMOL/L (ref 22–29)
CREAT CL PREDICTED SERPL C-G-VRATE: 52 ML/MIN (ref 70–130)
D DIMER PPP FEU-MCNC: (no result) *MCG/ML (ref 0.27–0.43)
FIBRINOGEN PPP-MCNC: 394 MG/DL (ref 253–463)
GLOBULIN SER CALC-MCNC: 2.8 G/DL (ref 2.4–3.5)
GLUCOSE SERPL-MCNC: 87 MG/DL (ref 70–105)
HCO3 BLDA-SCNC: 17.8 MEQ/L (ref 22–28)
HCT VFR BLDA CALC: 22 % (ref 42–52)
HGB BLD-MCNC: 7.3 G/DL (ref 14–18)
HGB BLD-MCNC: 7.4 G/DL (ref 14–18)
HGB BLD-MCNC: 7.6 G/DL (ref 14–18)
HGB BLD-MCNC: 7.6 G/DL (ref 14–18)
HGB BLD-MCNC: 8 G/DL (ref 14–18)
HGB BLDA-MCNC: 7.4 G/DL (ref 14–18)
HIV 1/2 INDEX: 0.25 S/CO (ref ?–1)
INR PPP: 1.2
MAGNESIUM SERPL-MCNC: 2.3 MG/DL (ref 1.6–2.6)
MCH RBC QN AUTO: 30.1 PG (ref 27–31)
MCH RBC QN AUTO: 30.2 PG (ref 27–31)
MCH RBC QN AUTO: 30.7 PG (ref 27–31)
MCH RBC QN AUTO: 30.9 PG (ref 27–31)
MCH RBC QN AUTO: 32.2 PG (ref 27–31)
MCV RBC AUTO: 86.6 FL (ref 78–98)
MCV RBC AUTO: 87 FL (ref 78–98)
MCV RBC AUTO: 87.2 FL (ref 78–98)
MCV RBC AUTO: 87.4 FL (ref 78–98)
MCV RBC AUTO: 88.7 FL (ref 78–98)
MDIFF COMPLETE?: YES
O2 A-A PPRESDIFF RESPIRATORY: 135.1 MMHG (ref 0–20)
PCO2 BLDA: 37.2 MMHG (ref 35–45)
PH BLDA: 7.3 [PH] (ref 7.35–7.45)
PLATELET # BLD AUTO: 31 THOU/UL (ref 130–400)
PLATELET # BLD AUTO: 32 THOU/UL (ref 130–400)
PLATELET # BLD AUTO: 35 THOU/UL (ref 130–400)
PLATELET # BLD AUTO: 36 THOU/UL (ref 130–400)
PLATELET # BLD AUTO: 38 THOU/UL (ref 130–400)
PLATELET # BLD AUTO: 38 THOU/UL (ref 130–400)
PLATELET # BLD AUTO: 42 THOU/UL (ref 130–400)
PO2 BLDA: 103.6 MMHG (ref 80–100)
POTASSIUM BLD-SCNC: 4.57 MMOL/L (ref 3.7–5.3)
POTASSIUM SERPL-SCNC: 4.6 MMOL/L (ref 3.5–5.1)
PROTHROMBIN TIME: 15.2 SEC (ref 12–14.7)
RBC # BLD AUTO: 2.36 MILL/UL (ref 4.7–6.1)
RBC # BLD AUTO: 2.36 MILL/UL (ref 4.7–6.1)
RBC # BLD AUTO: 2.4 MILL/UL (ref 4.7–6.1)
RBC # BLD AUTO: 2.51 MILL/UL (ref 4.7–6.1)
RBC # BLD AUTO: 2.66 MILL/UL (ref 4.7–6.1)
RETICS/RBC NFR: 2.2 % (ref 0.5–1.5)
SODIUM SERPL-SCNC: 134 MMOL/L (ref 136–145)
SPECIMEN DRAWN FROM PATIENT: (no result)
WBC # BLD AUTO: 10.3 THOU/UL (ref 4.8–10.8)
WBC # BLD AUTO: 11 THOU/UL (ref 4.8–10.8)
WBC # BLD AUTO: 13 THOU/UL (ref 4.8–10.8)
WBC # BLD AUTO: 16.2 THOU/UL (ref 4.8–10.8)
WBC # BLD AUTO: 9.4 THOU/UL (ref 4.8–10.8)

## 2022-09-06 PROCEDURE — 0W3P8ZZ CONTROL BLEEDING IN GASTROINTESTINAL TRACT, VIA NATURAL OR ARTIFICIAL OPENING ENDOSCOPIC: ICD-10-PCS | Performed by: INTERNAL MEDICINE

## 2022-09-06 RX ADMIN — Medication SCH MLS: at 00:19

## 2022-09-06 RX ADMIN — DEXTROSE MONOHYDRATE PRN GM: 25 INJECTION, SOLUTION INTRAVENOUS at 16:32

## 2022-09-06 RX ADMIN — VANCOMYCIN HYDROCHLORIDE SCH MLS: 10 INJECTION, POWDER, LYOPHILIZED, FOR SOLUTION INTRAVENOUS at 21:54

## 2022-09-06 RX ADMIN — Medication SCH MLS: at 12:30

## 2022-09-07 LAB
ALBUMIN SERPL BCG-MCNC: 2.6 G/DL (ref 3.5–5)
ALP SERPL-CCNC: 325 U/L (ref 40–110)
ALT SERPL W P-5'-P-CCNC: 20 U/L (ref 8–55)
ANALYZER IN CARDIO: (no result)
ANION GAP SERPL CALC-SCNC: 15 MMOL/L (ref 10–20)
AST SERPL-CCNC: 44 U/L (ref 5–34)
BASE EXCESS STD BLDA CALC-SCNC: -8.1 MEQ/L
BILIRUB SERPL-MCNC: 0.9 MG/DL (ref 0.2–1.2)
BUN SERPL-MCNC: 28 MG/DL (ref 8.4–25.7)
CA-I BLDA-SCNC: 1.17 MMOL/L (ref 1.12–1.3)
CALCIUM SERPL-MCNC: 8 MG/DL (ref 7.8–10.44)
CHLORIDE SERPL-SCNC: 112 MMOL/L (ref 98–107)
CO2 SERPL-SCNC: 18 MMOL/L (ref 22–29)
CREAT CL PREDICTED SERPL C-G-VRATE: 62 ML/MIN (ref 70–130)
GLOBULIN SER CALC-MCNC: 2.8 G/DL (ref 2.4–3.5)
GLUCOSE SERPL-MCNC: 163 MG/DL (ref 70–105)
GLUCOSE SERPL-MCNC: 80 MG/DL (ref 70–105)
HCO3 BLDA-SCNC: 18.1 MEQ/L (ref 22–28)
HCT VFR BLDA CALC: 26 % (ref 42–52)
HGB BLD-MCNC: 6.6 G/DL (ref 14–18)
HGB BLDA-MCNC: 8.7 G/DL (ref 14–18)
MCH RBC QN AUTO: 30.2 PG (ref 27–31)
MCV RBC AUTO: 89.6 FL (ref 78–98)
MDIFF COMPLETE?: YES
PCO2 BLDA: 39.6 MMHG (ref 35–45)
PH BLDA: 7.28 [PH] (ref 7.35–7.45)
PLATELET # BLD AUTO: 22 THOU/UL (ref 130–400)
PO2 BLDA: 79.8 MMHG (ref 80–100)
POTASSIUM BLD-SCNC: 4.54 MMOL/L (ref 3.7–5.3)
POTASSIUM SERPL-SCNC: 4.7 MMOL/L (ref 3.5–5.1)
RBC # BLD AUTO: 2.18 MILL/UL (ref 4.7–6.1)
RETICS/RBC NFR: 2.4 % (ref 0.5–1.5)
SODIUM SERPL-SCNC: 140 MMOL/L (ref 136–145)
SPECIMEN DRAWN FROM PATIENT: (no result)
VANCOMYCIN TROUGH SERPL-MCNC: 12.9 UG/ML
WBC # BLD AUTO: 10.3 THOU/UL (ref 4.8–10.8)

## 2022-09-07 RX ADMIN — DEXTROSE MONOHYDRATE PRN GM: 25 INJECTION, SOLUTION INTRAVENOUS at 08:33

## 2022-09-07 RX ADMIN — ASCORBIC ACID, VITAMIN A PALMITATE, CHOLECALCIFEROL, THIAMINE HYDROCHLORIDE, RIBOFLAVIN-5 PHOSPHATE SODIUM, PYRIDOXINE HYDROCHLORIDE, NIACINAMIDE, DEXPANTHENOL, ALPHA-TOCOPHEROL ACETATE, VITAMIN K1, FOLIC ACID, BIOTIN, CYANOCOBALAMIN SCH MLS: 200; 3300; 200; 6; 3.6; 6; 40; 15; 10; 150; 600; 60; 5 INJECTION, SOLUTION INTRAVENOUS at 15:52

## 2022-09-07 RX ADMIN — Medication SCH MLS: at 15:37

## 2022-09-07 RX ADMIN — VANCOMYCIN HYDROCHLORIDE SCH MLS: 1 INJECTION, SOLUTION INTRAVENOUS at 23:03

## 2022-09-07 RX ADMIN — Medication SCH MLS: at 01:37

## 2022-09-07 RX ADMIN — DEXTROSE MONOHYDRATE PRN GM: 25 INJECTION, SOLUTION INTRAVENOUS at 21:30

## 2022-09-08 LAB
ALBUMIN SERPL BCG-MCNC: 2.3 G/DL (ref 3.5–5)
ALP SERPL-CCNC: 278 U/L (ref 40–110)
ALT SERPL W P-5'-P-CCNC: 15 U/L (ref 8–55)
ANION GAP SERPL CALC-SCNC: 13 MMOL/L (ref 10–20)
APTT PPP: 38.5 SEC (ref 22.9–36.1)
AST SERPL-CCNC: 27 U/L (ref 5–34)
BILIRUB SERPL-MCNC: 0.9 MG/DL (ref 0.2–1.2)
BUN SERPL-MCNC: 21 MG/DL (ref 8.4–25.7)
CALCIUM SERPL-MCNC: 8.1 MG/DL (ref 7.8–10.44)
CHLORIDE SERPL-SCNC: 111 MMOL/L (ref 98–107)
CMV DNA SERPL NAA+PROBE-ACNC: (no result) IU/ML
CMV DNA SERPL NAA+PROBE-LOG IU: (no result) {LOG_IU}/ML
CO2 SERPL-SCNC: 20 MMOL/L (ref 22–29)
CREAT CL PREDICTED SERPL C-G-VRATE: 72 ML/MIN (ref 70–130)
D DIMER PPP FEU-MCNC: (no result) *MCG/ML (ref 0.27–0.43)
FIBRINOGEN PPP-MCNC: 519 MG/DL (ref 253–463)
GLOBULIN SER CALC-MCNC: 2.6 G/DL (ref 2.4–3.5)
GLUCOSE SERPL-MCNC: 117 MG/DL (ref 70–105)
HGB BLD-MCNC: 7.4 G/DL (ref 14–18)
INR PPP: 1.2
MAGNESIUM SERPL-MCNC: 2 MG/DL (ref 1.6–2.6)
MCH RBC QN AUTO: 30.1 PG (ref 27–31)
MCV RBC AUTO: 89.7 FL (ref 78–98)
MDIFF COMPLETE?: YES
PLATELET # BLD AUTO: 14 THOU/UL (ref 130–400)
PLATELET # BLD AUTO: 14 THOU/UL (ref 130–400)
POLYCHROMASIA BLD QL SMEAR: (no result) (100X)
POTASSIUM SERPL-SCNC: 4.3 MMOL/L (ref 3.5–5.1)
POTASSIUM UR-SCNC: 18.2 MMOL/L
PROTHROMBIN TIME: 15.2 SEC (ref 12–14.7)
RBC # BLD AUTO: 2.44 MILL/UL (ref 4.7–6.1)
SCHISTOCYTES BLD QL SMEAR: (no result) (100X)
SODIUM SERPL-SCNC: 140 MMOL/L (ref 136–145)
SODIUM UR-SCNC: (no result) MMOL/L
WBC # BLD AUTO: 9.3 THOU/UL (ref 4.8–10.8)

## 2022-09-08 PROCEDURE — 3E033XZ INTRODUCTION OF VASOPRESSOR INTO PERIPHERAL VEIN, PERCUTANEOUS APPROACH: ICD-10-PCS | Performed by: INTERNAL MEDICINE

## 2022-09-08 RX ADMIN — VANCOMYCIN HYDROCHLORIDE SCH: 10 INJECTION, POWDER, LYOPHILIZED, FOR SOLUTION INTRAVENOUS at 07:00

## 2022-09-08 RX ADMIN — NOREPINEPHRINE BITARTRATE PRN MLS: 8 INJECTION, SOLUTION INTRAVENOUS at 08:41

## 2022-09-08 RX ADMIN — ASCORBIC ACID, VITAMIN A PALMITATE, CHOLECALCIFEROL, THIAMINE HYDROCHLORIDE, RIBOFLAVIN-5 PHOSPHATE SODIUM, PYRIDOXINE HYDROCHLORIDE, NIACINAMIDE, DEXPANTHENOL, ALPHA-TOCOPHEROL ACETATE, VITAMIN K1, FOLIC ACID, BIOTIN, CYANOCOBALAMIN SCH MLS: 200; 3300; 200; 6; 3.6; 6; 40; 15; 10; 150; 600; 60; 5 INJECTION, SOLUTION INTRAVENOUS at 16:11

## 2022-09-08 RX ADMIN — NOREPINEPHRINE BITARTRATE PRN MLS: 8 INJECTION, SOLUTION INTRAVENOUS at 22:03

## 2022-09-08 RX ADMIN — VANCOMYCIN HYDROCHLORIDE SCH MLS: 1 INJECTION, SOLUTION INTRAVENOUS at 08:47

## 2022-09-08 RX ADMIN — Medication SCH MLS: at 06:34

## 2022-09-08 RX ADMIN — ASCORBIC ACID, VITAMIN A PALMITATE, CHOLECALCIFEROL, THIAMINE HYDROCHLORIDE, RIBOFLAVIN-5 PHOSPHATE SODIUM, PYRIDOXINE HYDROCHLORIDE, NIACINAMIDE, DEXPANTHENOL, ALPHA-TOCOPHEROL ACETATE, VITAMIN K1, FOLIC ACID, BIOTIN, CYANOCOBALAMIN SCH: 200; 3300; 200; 6; 3.6; 6; 40; 15; 10; 150; 600; 60; 5 INJECTION, SOLUTION INTRAVENOUS at 05:16

## 2022-09-08 RX ADMIN — VANCOMYCIN HYDROCHLORIDE SCH MLS: 1 INJECTION, SOLUTION INTRAVENOUS at 22:02

## 2022-09-09 LAB
ALBUMIN SERPL BCG-MCNC: 2.3 G/DL (ref 3.5–5)
ALP SERPL-CCNC: 267 U/L (ref 40–110)
ALT SERPL W P-5'-P-CCNC: 13 U/L (ref 8–55)
ANION GAP SERPL CALC-SCNC: 12 MMOL/L (ref 10–20)
ANION GAP SERPL CALC-SCNC: 14 MMOL/L (ref 10–20)
AST SERPL-CCNC: 25 U/L (ref 5–34)
BILIRUB SERPL-MCNC: 0.9 MG/DL (ref 0.2–1.2)
BUN SERPL-MCNC: 15 MG/DL (ref 8.4–25.7)
BUN SERPL-MCNC: 16 MG/DL (ref 8.4–25.7)
CALCIUM SERPL-MCNC: 8.4 MG/DL (ref 7.8–10.44)
CALCIUM SERPL-MCNC: 8.4 MG/DL (ref 7.8–10.44)
CHLORIDE SERPL-SCNC: 111 MMOL/L (ref 98–107)
CHLORIDE SERPL-SCNC: 113 MMOL/L (ref 98–107)
CO2 SERPL-SCNC: 18 MMOL/L (ref 22–29)
CO2 SERPL-SCNC: 21 MMOL/L (ref 22–29)
CREAT CL PREDICTED SERPL C-G-VRATE: 78 ML/MIN (ref 70–130)
CREAT CL PREDICTED SERPL C-G-VRATE: 88 ML/MIN (ref 70–130)
GLOBULIN SER CALC-MCNC: 2.8 G/DL (ref 2.4–3.5)
GLUCOSE SERPL-MCNC: 102 MG/DL (ref 70–105)
GLUCOSE SERPL-MCNC: 117 MG/DL (ref 70–105)
GLUCOSE SERPL-MCNC: 128 MG/DL (ref 70–105)
GLUCOSE SERPL-MCNC: 73 MG/DL (ref 70–105)
GLUCOSE SERPL-MCNC: 76 MG/DL (ref 70–105)
GLUCOSE SERPL-MCNC: 87 MG/DL (ref 70–105)
HGB BLD-MCNC: 7.1 G/DL (ref 14–18)
MAGNESIUM SERPL-MCNC: 1.9 MG/DL (ref 1.6–2.6)
MANUAL DIF COMMENT BLD-IMP: (no result)
MCH RBC QN AUTO: 32.6 PG (ref 27–31)
MCV RBC AUTO: 89.7 FL (ref 78–98)
MDIFF COMPLETE?: YES
PLATELET # BLD AUTO: 8 THOU/UL (ref 130–400)
POLYCHROMASIA BLD QL SMEAR: (no result) (100X)
POTASSIUM SERPL-SCNC: 4.1 MMOL/L (ref 3.5–5.1)
POTASSIUM SERPL-SCNC: 4.3 MMOL/L (ref 3.5–5.1)
RBC # BLD AUTO: 2.16 MILL/UL (ref 4.7–6.1)
SODIUM SERPL-SCNC: 140 MMOL/L (ref 136–145)
SODIUM SERPL-SCNC: 141 MMOL/L (ref 136–145)
VANCOMYCIN TROUGH SERPL-MCNC: 17.8 UG/ML
WBC # BLD AUTO: 9.3 THOU/UL (ref 4.8–10.8)

## 2022-09-09 RX ADMIN — VANCOMYCIN HYDROCHLORIDE SCH MLS: 1 INJECTION, SOLUTION INTRAVENOUS at 10:42

## 2022-09-09 RX ADMIN — ASCORBIC ACID, VITAMIN A PALMITATE, CHOLECALCIFEROL, THIAMINE HYDROCHLORIDE, RIBOFLAVIN-5 PHOSPHATE SODIUM, PYRIDOXINE HYDROCHLORIDE, NIACINAMIDE, DEXPANTHENOL, ALPHA-TOCOPHEROL ACETATE, VITAMIN K1, FOLIC ACID, BIOTIN, CYANOCOBALAMIN SCH MLS: 200; 3300; 200; 6; 3.6; 6; 40; 15; 10; 150; 600; 60; 5 INJECTION, SOLUTION INTRAVENOUS at 21:00

## 2022-09-09 RX ADMIN — NOREPINEPHRINE BITARTRATE PRN MLS: 8 INJECTION, SOLUTION INTRAVENOUS at 16:44

## 2022-09-09 RX ADMIN — Medication SCH MLS: at 01:50

## 2022-09-09 RX ADMIN — ASCORBIC ACID, VITAMIN A PALMITATE, CHOLECALCIFEROL, THIAMINE HYDROCHLORIDE, RIBOFLAVIN-5 PHOSPHATE SODIUM, PYRIDOXINE HYDROCHLORIDE, NIACINAMIDE, DEXPANTHENOL, ALPHA-TOCOPHEROL ACETATE, VITAMIN K1, FOLIC ACID, BIOTIN, CYANOCOBALAMIN SCH: 200; 3300; 200; 6; 3.6; 6; 40; 15; 10; 150; 600; 60; 5 INJECTION, SOLUTION INTRAVENOUS at 07:40

## 2022-09-10 VITALS — SYSTOLIC BLOOD PRESSURE: 102 MMHG | DIASTOLIC BLOOD PRESSURE: 55 MMHG

## 2022-09-10 VITALS — TEMPERATURE: 98.5 F

## 2022-09-10 LAB
ALBUMIN SERPL BCG-MCNC: 2.2 G/DL (ref 3.5–5)
ALP SERPL-CCNC: 235 U/L (ref 40–110)
ALT SERPL W P-5'-P-CCNC: 15 U/L (ref 8–55)
ANION GAP SERPL CALC-SCNC: 12 MMOL/L (ref 10–20)
ANISOCYTOSIS BLD QL SMEAR: (no result) (100X)
AST SERPL-CCNC: 32 U/L (ref 5–34)
BILIRUB SERPL-MCNC: 1.1 MG/DL (ref 0.2–1.2)
BUN SERPL-MCNC: 15 MG/DL (ref 8.4–25.7)
CALCIUM SERPL-MCNC: 8.5 MG/DL (ref 7.8–10.44)
CHLORIDE SERPL-SCNC: 111 MMOL/L (ref 98–107)
CO2 SERPL-SCNC: 20 MMOL/L (ref 22–29)
CREAT CL PREDICTED SERPL C-G-VRATE: 73 ML/MIN (ref 70–130)
GLOBULIN SER CALC-MCNC: 2.7 G/DL (ref 2.4–3.5)
GLUCOSE SERPL-MCNC: 102 MG/DL (ref 70–105)
GLUCOSE SERPL-MCNC: 109 MG/DL (ref 70–105)
GLUCOSE SERPL-MCNC: 171 MG/DL (ref 70–105)
GLUCOSE SERPL-MCNC: 72 MG/DL (ref 70–105)
GLUCOSE SERPL-MCNC: 86 MG/DL (ref 70–105)
HGB BLD-MCNC: 6.2 G/DL (ref 14–18)
HGB BLD-MCNC: 7.8 G/DL (ref 14–18)
MANUAL DIF COMMENT BLD-IMP: (no result)
MCH RBC QN AUTO: 30.2 PG (ref 27–31)
MCH RBC QN AUTO: 30.6 PG (ref 27–31)
MCV RBC AUTO: 87.9 FL (ref 78–98)
MCV RBC AUTO: 88.8 FL (ref 78–98)
MDIFF COMPLETE?: YES
PLATELET # BLD AUTO: 25 THOU/UL (ref 130–400)
PLATELET # BLD AUTO: 33 THOU/UL (ref 130–400)
POLYCHROMASIA BLD QL SMEAR: (no result) (100X)
POTASSIUM SERPL-SCNC: 3.8 MMOL/L (ref 3.5–5.1)
RBC # BLD AUTO: 2.05 MILL/UL (ref 4.7–6.1)
RBC # BLD AUTO: 2.55 MILL/UL (ref 4.7–6.1)
SCHISTOCYTES BLD QL SMEAR: (no result) (100X)
SODIUM SERPL-SCNC: 139 MMOL/L (ref 136–145)
WBC # BLD AUTO: 13.3 THOU/UL (ref 4.8–10.8)
WBC # BLD AUTO: 9.5 THOU/UL (ref 4.8–10.8)

## 2022-09-10 RX ADMIN — ASCORBIC ACID, VITAMIN A PALMITATE, CHOLECALCIFEROL, THIAMINE HYDROCHLORIDE, RIBOFLAVIN-5 PHOSPHATE SODIUM, PYRIDOXINE HYDROCHLORIDE, NIACINAMIDE, DEXPANTHENOL, ALPHA-TOCOPHEROL ACETATE, VITAMIN K1, FOLIC ACID, BIOTIN, CYANOCOBALAMIN SCH: 200; 3300; 200; 6; 3.6; 6; 40; 15; 10; 150; 600; 60; 5 INJECTION, SOLUTION INTRAVENOUS at 10:48

## 2022-09-10 RX ADMIN — NOREPINEPHRINE BITARTRATE PRN MLS: 8 INJECTION, SOLUTION INTRAVENOUS at 10:48

## 2022-09-10 RX ADMIN — Medication SCH MLS: at 01:04
